# Patient Record
Sex: MALE | Race: WHITE
[De-identification: names, ages, dates, MRNs, and addresses within clinical notes are randomized per-mention and may not be internally consistent; named-entity substitution may affect disease eponyms.]

---

## 2020-06-29 ENCOUNTER — HOSPITAL ENCOUNTER (EMERGENCY)
Dept: HOSPITAL 61 - ER | Age: 45
Discharge: HOME | End: 2020-06-29
Payer: COMMERCIAL

## 2020-06-29 VITALS — HEIGHT: 72 IN | BODY MASS INDEX: 34.04 KG/M2 | WEIGHT: 251.33 LBS

## 2020-06-29 VITALS — SYSTOLIC BLOOD PRESSURE: 145 MMHG | DIASTOLIC BLOOD PRESSURE: 93 MMHG

## 2020-06-29 DIAGNOSIS — F10.10: ICD-10-CM
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
## 2020-06-29 DIAGNOSIS — F41.9: ICD-10-CM
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
## 2020-06-29 DIAGNOSIS — I10: ICD-10-CM

## 2020-06-29 DIAGNOSIS — R60.0: ICD-10-CM

## 2020-06-29 DIAGNOSIS — S60.221A: Primary | ICD-10-CM

## 2020-06-29 PROCEDURE — 73130 X-RAY EXAM OF HAND: CPT

## 2020-06-29 PROCEDURE — 99283 EMERGENCY DEPT VISIT LOW MDM: CPT

## 2020-06-29 NOTE — PHYS DOC
Past Medical History


Past Medical History:  Anxiety, Hypertension


Past Surgical History:  No Surgical History


Smoking Status:  Never Smoker


Alcohol Use:  Heavy





General Adult


EDM:


Chief Complaint:  HAND PROBLEM





HPI:


HPI:





Patient is a 44  year old male who presents with pain and swelling to right hand

after punching a wall. States he had punched a wall approximately 90 minutes 

earlier, has been having swelling and discomfort since that time.  States he 

feels some cracking in his hand and the swelling and thinks it's broken. States 

he has not taken pain medications, and it currently feels good. Denies pain to 

elbow, shoulder, wrist. States only pain to right 4th/5th digits on hand. States

he punched the wall out of anger due to a comment on facebook regarding a dying 

friend





Review of Systems:


Review of Systems:


Constitutional:   Denies fever or chills. []





Respiratory:   Denies cough or shortness of breath. [] 


Cardiovascular:   Denies chest pain or edema. [] 





Musculoskeletal:   Denies back pain or joint pain. Complains of pain to right 

hand, with swelling [] 


Integument:   Denies rash. denies erythema [] 


Neurologic:   Denies headache, focal weakness or sensory changes. []





Heart Score:


Risk Factors:


Risk Factors:  DM, Current or recent (<one month) smoker, HTN, HLP, family 

history of CAD, obesity.


Risk Scores:


Score 0 - 3:  2.5% MACE over next 6 weeks - Discharge Home


Score 4 - 6:  20.3% MACE over next 6 weeks - Admit for Clinical Observation


Score 7 - 10:  72.7% MACE over next 6 weeks - Early Invasive Strategies





Allergies:


Allergies:





Allergies








Coded Allergies Type Severity Reaction Last Updated Verified


 


  No Known Drug Allergies    6/29/20 No











Physical Exam:


PE:





Constitutional: Well developed, well nourished, no acute distress, non-toxic 

appearance. []


HENT: Normocephalic, atraumatic, bilateral external ears normal, oropharynx 

moist, no oral exudates, nose normal. []





Skin: Warm, dry, no erythema, no rash. [] 


Extremities: Extremity above hand: No tenderness, no cyanosis, no clubbing, ROM 

intact, no edema. Right hand noted swelling, tenderness over palpation of 

4th/5th metacarpal. no bruising noted at this time. Brisk capillary refill in 

digits, sensation intact in digits. full ROM to wrist and digits but noted dis

comfort on flexion and extension of hand[] 


Neurologic: Alert and oriented X 3, normal motor function, normal sensory 

function, no focal deficits noted. []


Psychologic: Affect normal, judgement normal, mood normal. []





Current Patient Data:


Vital Signs:





                                   Vital Signs








  Date Time  Temp Pulse Resp B/P (MAP) Pulse Ox O2 Delivery O2 Flow Rate FiO2


 


6/29/20 18:08 98.6 74 16 145/93 (110) 100 Room Air  





 98.6       











EKG:


EKG:


[]





Radiology/Procedures:


Radiology/Procedures:


HISTORY: Right hand pain and swelling around the fifth metacarpal.


 


FINDINGS: There is a mild bowing deformity of the fifth metacarpal however


there is no discrete lucent fracture or cortical disruption this is most 


typical of an old healed boxer's fracture deformity. There may be mild 


soft tissue edema overlying the fifth metacarpal. No acute fracture 


evident. No dislocation.


 


IMPRESSION: No acute osseous injury. Old healed boxers deformity of the 


fifth metacarpal. See above.


 


Electronically signed by: Pina Wolf MD (6/29/2020 6:31 PM) 


[facility] [place]














DICTATED and SIGNED BY:     PINA WOLF MD


DATE:     06/29/20 1831


[]





Course & Med Decision Making:


Course & Med Decision Making


Pertinent Labs and Imaging studies reviewed. (See chart for details)





[]Discussed imaging results, with no noted fracture, but noted previous fracture

 of which patient is unaware. Will splint extremity out of precaution, patient 

to follow up with Ortho. Discussed use of tylenol/iburofen for discomfort. 

Patient off work tomorrow, will contact ortho





Dragon Disclaimer:


Dragon Disclaimer:


This electronic medical record was generated, in whole or in part, using a voice

 recognition dictation system.





Departure


Departure


Impression:  


   Primary Impression:  


   Contusion of right hand


   Qualified Codes:  S60.221A - Contusion of right hand, initial encounter


Disposition:  01 HOME, SELF-CARE


Condition:  GOOD


Referrals:  


MOMO BLANTON MD (PCP)








BLANKA FORTUNE MD


Patient Instructions:  Boxer's Fracture-SportsMed





Additional Instructions:  


As we discussed, continue to use tylenol/ibuprofen for discomfort. Continue to 

apply an ice pack to help decrease the swelling. keep the splint dry. Call the 

Orthopedic office tomorrow morning and try to get in for a follow up.  While 

there was no fracture seen on your x ray today, there was noted an old fracture 

of which you were not aware.  We have splinted your hand out of precaution, 

please follow up with orthopedics to have them reevaluate your hand.





Justicifation of Admission Dx:


Justifications for Admission:


Justification of Admission Dx:  N/A











HECTOR SAHU              Jun 29, 2020 18:23

## 2020-06-29 NOTE — RAD
Right hand x-rays 3 views

 

HISTORY: Right hand pain and swelling around the fifth metacarpal.

 

FINDINGS: There is a mild bowing deformity of the fifth metacarpal however

there is no discrete lucent fracture or cortical disruption this is most 

typical of an old healed boxer's fracture deformity. There may be mild 

soft tissue edema overlying the fifth metacarpal. No acute fracture 

evident. No dislocation.

 

IMPRESSION: No acute osseous injury. Old healed boxers deformity of the 

fifth metacarpal. See above.

 

Electronically signed by: Yossi Wolf MD (6/29/2020 6:31 PM) 

[facility]